# Patient Record
Sex: FEMALE | URBAN - METROPOLITAN AREA
[De-identification: names, ages, dates, MRNs, and addresses within clinical notes are randomized per-mention and may not be internally consistent; named-entity substitution may affect disease eponyms.]

---

## 2023-11-15 ENCOUNTER — OFFICE VISIT (OUTPATIENT)
Dept: URBAN - METROPOLITAN AREA CLINIC 92 | Facility: CLINIC | Age: 73
End: 2023-11-15
Payer: COMMERCIAL

## 2023-11-15 VITALS
WEIGHT: 130.2 LBS | SYSTOLIC BLOOD PRESSURE: 137 MMHG | DIASTOLIC BLOOD PRESSURE: 73 MMHG | BODY MASS INDEX: 28.09 KG/M2 | TEMPERATURE: 97.2 F | HEART RATE: 73 BPM | HEIGHT: 57 IN

## 2023-11-15 DIAGNOSIS — R14.0 BLOATING: ICD-10-CM

## 2023-11-15 DIAGNOSIS — R13.19 ESOPHAGEAL DYSPHAGIA: ICD-10-CM

## 2023-11-15 DIAGNOSIS — R14.2 BELCHING: ICD-10-CM

## 2023-11-15 DIAGNOSIS — R10.13 EPIGASTRIC DISCOMFORT: ICD-10-CM

## 2023-11-15 PROCEDURE — 99204 OFFICE O/P NEW MOD 45 MIN: CPT

## 2023-11-15 RX ORDER — METFORMIN HYDROCHLORIDE 1000 MG/1
1 TABLET WITH A MEAL TABLET, FILM COATED ORAL ONCE A DAY
Status: ACTIVE | COMMUNITY

## 2023-11-15 NOTE — HPI-TODAY'S VISIT:
73-year-old female patient presents today due to bloating.  She was referred to us by Dr. Martine Huber a copy of this note be sent to referring provider.  She was seen in 2020 for screening colonoscopy.  Colonoscopy 2-7-2020 demonstrated external hemorrhoids, erythematous mucosa in distal rectum path demonstrated focal active colitis likeyl from bowel prep.   Currently patient notes that she has been having epigastric abdominal discomfort as well as bloating for the past 2 months.  Eating and lying down makes the pain worse.  She was given omeprazole 40 mg by her PCP but did not help after several weeks so she discontinued.  Pain is a 6/10.  She also notes belching right after eating.  Denies any heartburn or regurgitation.  She also notes dysphagia to solids that has been occurring for many years.  She denies dysphagia with liquids, odynophagia.  Symptoms are not getting worse over time.  She smoked tobacco for 15 years about 4 cigarettes daily.  She has not been a heavy alcohol drinker. Bowel movements are typically regular but can have increased bowel movements with certain types of foods such as coffee.  She denies any hematochezia or melena. She had labs with her primary care provider in October she was told that her A1c was 8% so glipizide was added.  She was diagnosed with diabetes 7 to 8 years ago.  She was on Trulicity for several months and was switched over to Ozempic which she was causing her to gain weight, so she discontinued this 2 weeks ago.  Her primary care also thought that her symptoms may be from Ozempic use.

## 2023-12-12 ENCOUNTER — OUT OF OFFICE VISIT (OUTPATIENT)
Dept: URBAN - METROPOLITAN AREA SURGERY CENTER 16 | Facility: SURGERY CENTER | Age: 73
End: 2023-12-12
Payer: COMMERCIAL

## 2023-12-12 ENCOUNTER — CLAIMS CREATED FROM THE CLAIM WINDOW (OUTPATIENT)
Dept: URBAN - METROPOLITAN AREA CLINIC 4 | Facility: CLINIC | Age: 73
End: 2023-12-12
Payer: COMMERCIAL

## 2023-12-12 DIAGNOSIS — R13.19 CERVICAL DYSPHAGIA: ICD-10-CM

## 2023-12-12 DIAGNOSIS — K29.40 ATROPHIC GASTRITIS: ICD-10-CM

## 2023-12-12 DIAGNOSIS — K31.A19 GASTRIC INTESTINAL METAPLASIA WITHOUT DYSPLASIA: ICD-10-CM

## 2023-12-12 DIAGNOSIS — K21.9 ACID REFLUX: ICD-10-CM

## 2023-12-12 DIAGNOSIS — K21.9 GASTRO-ESOPHAGEAL REFLUX DISEASE WITHOUT ESOPHAGITIS: ICD-10-CM

## 2023-12-12 DIAGNOSIS — K29.40 CHRONIC ATROPHIC GASTRITIS WITHOUT BLEEDING: ICD-10-CM

## 2023-12-12 DIAGNOSIS — E03.8 ADULT ONSET HYPOTHYROIDISM: ICD-10-CM

## 2023-12-12 DIAGNOSIS — R10.13 ABDOMINAL DISCOMFORT, EPIGASTRIC: ICD-10-CM

## 2023-12-12 PROCEDURE — 88305 TISSUE EXAM BY PATHOLOGIST: CPT | Performed by: PATHOLOGY

## 2023-12-12 PROCEDURE — G8907 PT DOC NO EVENTS ON DISCHARG: HCPCS | Performed by: INTERNAL MEDICINE

## 2023-12-12 PROCEDURE — 88312 SPECIAL STAINS GROUP 1: CPT | Performed by: PATHOLOGY

## 2023-12-12 PROCEDURE — 00731 ANES UPR GI NDSC PX NOS: CPT | Performed by: ANESTHESIOLOGY

## 2023-12-12 PROCEDURE — 43239 EGD BIOPSY SINGLE/MULTIPLE: CPT | Performed by: INTERNAL MEDICINE

## 2023-12-12 PROCEDURE — 88341 IMHCHEM/IMCYTCHM EA ADD ANTB: CPT | Performed by: PATHOLOGY

## 2023-12-12 PROCEDURE — 88342 IMHCHEM/IMCYTCHM 1ST ANTB: CPT | Performed by: PATHOLOGY

## 2023-12-12 PROCEDURE — 00731 ANES UPR GI NDSC PX NOS: CPT | Performed by: ANESTHESIOLOGIST ASSISTANT

## 2024-01-25 ENCOUNTER — OFFICE VISIT (OUTPATIENT)
Dept: URBAN - METROPOLITAN AREA CLINIC 92 | Facility: CLINIC | Age: 74
End: 2024-01-25
Payer: COMMERCIAL

## 2024-01-25 ENCOUNTER — LAB OUTSIDE AN ENCOUNTER (OUTPATIENT)
Dept: URBAN - METROPOLITAN AREA CLINIC 92 | Facility: CLINIC | Age: 74
End: 2024-01-25

## 2024-01-25 VITALS
SYSTOLIC BLOOD PRESSURE: 154 MMHG | DIASTOLIC BLOOD PRESSURE: 79 MMHG | HEIGHT: 57 IN | TEMPERATURE: 97.3 F | HEART RATE: 71 BPM | BODY MASS INDEX: 28.26 KG/M2 | WEIGHT: 131 LBS

## 2024-01-25 DIAGNOSIS — R10.13 EPIGASTRIC DISCOMFORT: ICD-10-CM

## 2024-01-25 DIAGNOSIS — R13.19 ESOPHAGEAL DYSPHAGIA: ICD-10-CM

## 2024-01-25 DIAGNOSIS — R14.2 BELCHING: ICD-10-CM

## 2024-01-25 DIAGNOSIS — K29.40 GASTRIC ATROPHY: ICD-10-CM

## 2024-01-25 DIAGNOSIS — R14.0 BLOATING: ICD-10-CM

## 2024-01-25 DIAGNOSIS — R19.7 DIARRHEA, UNSPECIFIED TYPE: ICD-10-CM

## 2024-01-25 PROBLEM — 84568007: Status: ACTIVE | Noted: 2024-01-25

## 2024-01-25 PROCEDURE — 99214 OFFICE O/P EST MOD 30 MIN: CPT

## 2024-01-25 RX ORDER — METFORMIN HYDROCHLORIDE 1000 MG/1
1 TABLET WITH A MEAL TABLET, FILM COATED ORAL ONCE A DAY
Status: ACTIVE | COMMUNITY

## 2024-01-25 RX ORDER — PANTOPRAZOLE SODIUM 40 MG/1
1 TABLET TABLET, DELAYED RELEASE ORAL ONCE A DAY
Qty: 90 TABLET | Refills: 1 | OUTPATIENT
Start: 2024-01-25

## 2024-01-25 NOTE — HPI-TODAY'S VISIT:
11/15/23 73-year-old female patient presents today due to bloating.  She was referred to us by Dr. Martine Huber a copy of this note be sent to referring provider.  She was seen in 2020 for screening colonoscopy.  Colonoscopy 2-7-2020 demonstrated external hemorrhoids, erythematous mucosa in distal rectum path demonstrated focal active colitis likeyl from bowel prep.   Currently patient notes that she has been having epigastric abdominal discomfort as well as bloating for the past 2 months.  Eating and lying down makes the pain worse.  She was given omeprazole 40 mg by her PCP but did not help after several weeks so she discontinued.  Pain is a 6/10.  She also notes belching right after eating.  Denies any heartburn or regurgitation.  She also notes dysphagia to solids that has been occurring for many years.  She denies dysphagia with liquids, odynophagia.  Symptoms are not getting worse over time.  She smoked tobacco for 15 years about 4 cigarettes daily.  She has not been a heavy alcohol drinker. Bowel movements are typically regular but can have increased bowel movements with certain types of foods such as coffee.  She denies any hematochezia or melena. She had labs with her primary care provider in October she was told that her A1c was 8% so glipizide was added.  She was diagnosed with diabetes 7 to 8 years ago.  She was on Trulicity for several months and was switched over to Ozempic which she was causing her to gain weight, so she discontinued this 2 weeks ago.  Her primary care also thought that her symptoms may be from Ozempic use.   1/25/24 Since her last visit patient had a upper endoscopy on 12- that demonstrated diffuse atrophic mucosa in the entire stomach biopsies demonstrated chronic lymphoplasmacytic infiltrates, body type granular atrophy,  endocrine cell hyperplasia and focal intestinal metaplasia.  She also had reflux type changes in the esophagus. Since her last visit patient has had a decrease in bloating but now is having more epigastric abdominal pain after consuming anything that she eats.  She also has increased gas and will have belching throughout the day.  She still denies any heartburn or regurgitation.  Her dysphagia has gotten better. She is now also noting increased diarrhea and has about 3 bowel movements daily that are looser.  No nighttime symptoms or urgency.  No new medications or antibiotics since onset of symptoms.  She denies any hematochezia or melena.  She denies any abnormal weight loss. Blood work in December noted that her A1c had gone up to 10%. She is off ozempic

## 2024-01-27 ENCOUNTER — LAB OUTSIDE AN ENCOUNTER (OUTPATIENT)
Dept: URBAN - METROPOLITAN AREA CLINIC 92 | Facility: CLINIC | Age: 74
End: 2024-01-27

## 2024-01-29 ENCOUNTER — TELEPHONE ENCOUNTER (OUTPATIENT)
Dept: URBAN - METROPOLITAN AREA CLINIC 92 | Facility: CLINIC | Age: 74
End: 2024-01-29

## 2024-01-29 LAB
ABSOLUTE BASOPHILS: 43
ABSOLUTE EOSINOPHILS: 408
ABSOLUTE LYMPHOCYTES: 3196
ABSOLUTE MONOCYTES: 689
ABSOLUTE NEUTROPHILS: 4165
ANTIPARIETAL CELL ANTIBODY: 48
BASOPHILS: 0.5
EOSINOPHILS: 4.8
FERRITIN, SERUM: 13
HEMATOCRIT: 39.1
HEMOGLOBIN: 12.2
INTRINSIC FACTOR BLOCKING: NEGATIVE
IRON BIND.CAP.(TIBC): 379
IRON SATURATION: 11
IRON: 42
LYMPHOCYTES: 37.6
MCH: 25.8
MCHC: 31.2
MCV: 82.8
MONOCYTES: 8.1
MPV: 10.7
NEUTROPHILS: 49
PLATELET COUNT: 314
RDW: 15
RED BLOOD CELL COUNT: 4.72
VITAMIN B12: 272
WHITE BLOOD CELL COUNT: 8.5

## 2024-01-29 RX ORDER — FERROUS SULFATE 325(65) MG
1 TABLET TABLET ORAL ONCE A DAY
Qty: 90 TABLET | Refills: 0 | OUTPATIENT
Start: 2024-01-29

## 2024-01-31 ENCOUNTER — TELEPHONE ENCOUNTER (OUTPATIENT)
Dept: URBAN - METROPOLITAN AREA CLINIC 92 | Facility: CLINIC | Age: 74
End: 2024-01-31

## 2024-01-31 LAB
ADENOVIRUS F 40/41: NOT DETECTED
CALPROTECTIN, STOOL - QDX: (no result)
CAMPYLOBACTER: NOT DETECTED
CLOSTRIDIUM DIFFICILE: NOT DETECTED
ENTAMOEBA HISTOLYTICA: NOT DETECTED
ENTEROAGGREGATIVE E.COLI: NOT DETECTED
ENTEROTOXIGENIC E.COLI: NOT DETECTED
ESCHERICHIA COLI O157: NOT DETECTED
GIARDIA LAMBLIA: NOT DETECTED
NOROVIRUS GI/GII: NOT DETECTED
PANCREATICELASTASE ELISA, STOOL: (no result)
ROTAVIRUS A: NOT DETECTED
SALMONELLA SPP.: NOT DETECTED
SHIGA-LIKE TOXIN PRODUCING E.COLI: NOT DETECTED
SHIGELLA SPP. / ENTEROINVASIVE E.COLI: NOT DETECTED
VIBRIO PARAHAEMOLYTICUS: NOT DETECTED
VIBRIO SPP.: NOT DETECTED
YERSINIA ENTEROCOLITICA: NOT DETECTED

## 2024-01-31 RX ORDER — PANCRELIPASE 36000; 180000; 114000 [USP'U]/1; [USP'U]/1; [USP'U]/1
AS DIRECTED CAPSULE, DELAYED RELEASE PELLETS ORAL
Qty: 800 | Refills: 1 | OUTPATIENT
Start: 2024-01-31 | End: 2024-07-29

## 2024-03-28 ENCOUNTER — OV EP (OUTPATIENT)
Dept: URBAN - METROPOLITAN AREA CLINIC 92 | Facility: CLINIC | Age: 74
End: 2024-03-28
Payer: COMMERCIAL

## 2024-03-28 VITALS
DIASTOLIC BLOOD PRESSURE: 82 MMHG | SYSTOLIC BLOOD PRESSURE: 142 MMHG | HEART RATE: 77 BPM | TEMPERATURE: 97 F | HEIGHT: 57 IN | BODY MASS INDEX: 27.4 KG/M2 | WEIGHT: 127 LBS

## 2024-03-28 DIAGNOSIS — R13.19 ESOPHAGEAL DYSPHAGIA: ICD-10-CM

## 2024-03-28 DIAGNOSIS — R10.13 EPIGASTRIC DISCOMFORT: ICD-10-CM

## 2024-03-28 DIAGNOSIS — R14.2 BELCHING: ICD-10-CM

## 2024-03-28 DIAGNOSIS — K29.40 GASTRIC ATROPHY: ICD-10-CM

## 2024-03-28 DIAGNOSIS — R14.0 BLOATING: ICD-10-CM

## 2024-03-28 DIAGNOSIS — R19.7 DIARRHEA, UNSPECIFIED TYPE: ICD-10-CM

## 2024-03-28 PROCEDURE — 99213 OFFICE O/P EST LOW 20 MIN: CPT

## 2024-03-28 RX ORDER — METFORMIN HYDROCHLORIDE 1000 MG/1
1 TABLET WITH A MEAL TABLET, FILM COATED ORAL ONCE A DAY
Status: ACTIVE | COMMUNITY

## 2024-03-28 RX ORDER — FERROUS SULFATE 325(65) MG
1 TABLET TABLET ORAL ONCE A DAY
Qty: 90 TABLET | Refills: 0 | Status: ACTIVE | COMMUNITY
Start: 2024-01-29

## 2024-03-28 RX ORDER — PANTOPRAZOLE SODIUM 40 MG/1
1 TABLET TABLET, DELAYED RELEASE ORAL ONCE A DAY
Qty: 90 TABLET | Refills: 1 | Status: ACTIVE | COMMUNITY
Start: 2024-01-25

## 2024-03-28 NOTE — PHYSICAL EXAM CONSTITUTIONAL:
Pt requesting to speak with daughter. Nurse called and left message for daughter to call her room phone. normal,  alert,  in no acute distress,  well developed, well nourished,  ambulating without difficulty,  normal communication ability

## 2024-03-28 NOTE — HPI-TODAY'S VISIT:
11/15/23 73-year-old female patient presents today due to bloating.  She was referred to us by Dr. Martine Huber a copy of this note be sent to referring provider.  She was seen in 2020 for screening colonoscopy.  Colonoscopy 2-7-2020 demonstrated external hemorrhoids, erythematous mucosa in distal rectum path demonstrated focal active colitis likeyl from bowel prep.   Currently patient notes that she has been having epigastric abdominal discomfort as well as bloating for the past 2 months.  Eating and lying down makes the pain worse.  She was given omeprazole 40 mg by her PCP but did not help after several weeksso she discontinued.  Pain is a 6/10.  She also notes belching right after eating.  Denies any heartburn or regurgitation.  She also notes dysphagia to solids that has been occurring for many years.  She denies dysphagia with liquids, odynophagia.  Symptoms are not getting worse over time.  She smoked tobacco for 15 years about 4 cigarettes daily.  She has not been a heavy alcohol drinker. Bowel movements are typically regular but can have increased bowel movements with certain types of foods such as coffee.  She denies any hematochezia or melena. She had labs with her primary care provider in October she was told that her A1c was 8%so glipizide was added.  She was diagnosed with diabetes 7 to 8 years ago.  She was on Trulicity for several months and was switched over to Ozempic which she was causing her to gain weight, so she discontinued this 2 weeks ago.  Her primary care also thought that her symptoms may be from Ozempic use.   1/25/24 Since her last visit patient had a upper endoscopy on 12- that demonstrated diffuse atrophic mucosa in the entire stomach biopsies demonstrated chronic lymphoplasmacytic infiltrates, body type granular atrophy,  endocrine cell hyperplasia and focal intestinal metaplasia.  She also had reflux type changes in the esophagus. Since her last visit patient has had a decrease in bloating but now is having more epigastric abdominal pain after consuming anything that she eats.  She also has increased gas and will have belching throughout the day.  She still denies any heartburn or regurgitation.  Her dysphagia has gotten better. She is now also noting increased diarrhea and has about 3 bowel movements daily that are looser.  No nighttime symptoms or urgency.  No new medications or antibiotics since onset of symptoms.  She denies any hematochezia or melena.  She denies any abnormal weight loss. Blood work in December noted that her A1c had gone up to 10%. She is off ozempic  3/28/24 After last visit we obtained labs that demonstrated normal CBC, vitamin B12, intrinsic factor.  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 She has be on oral iron daily. Has constipation with this.  Stool demonstrated calprotectin 125, pancreatic elastase 188 negative for infection.  She was given Creon for her EPI. She stopped this since it make her glucose high. Her BM have gone back to normal. No hematochezia or melena.  CT scan 1- demonstrated diverticulosis without diverticulitis and history of sacrococcygeal osseous injury no abnormalities of pancreas She still has belching, bloating and gas. She was told to start pantoprazole 40mg dailybut she has not been compliant with this.

## 2024-05-22 ENCOUNTER — DASHBOARD ENCOUNTERS (OUTPATIENT)
Age: 74
End: 2024-05-22

## 2024-05-28 ENCOUNTER — OFFICE VISIT (OUTPATIENT)
Dept: URBAN - METROPOLITAN AREA CLINIC 92 | Facility: CLINIC | Age: 74
End: 2024-05-28

## 2024-05-28 RX ORDER — PANTOPRAZOLE SODIUM 40 MG/1
1 TABLET TABLET, DELAYED RELEASE ORAL ONCE A DAY
Qty: 90 TABLET | Refills: 1 | Status: ACTIVE | COMMUNITY
Start: 2024-01-25

## 2024-05-28 RX ORDER — FERROUS SULFATE 325(65) MG
1 TABLET TABLET ORAL ONCE A DAY
Qty: 90 TABLET | Refills: 0 | Status: ACTIVE | COMMUNITY
Start: 2024-01-29

## 2024-05-28 RX ORDER — METFORMIN HYDROCHLORIDE 1000 MG/1
1 TABLET WITH A MEAL TABLET, FILM COATED ORAL ONCE A DAY
Status: ACTIVE | COMMUNITY

## 2024-06-18 ENCOUNTER — OFFICE VISIT (OUTPATIENT)
Dept: URBAN - METROPOLITAN AREA CLINIC 92 | Facility: CLINIC | Age: 74
End: 2024-06-18

## 2024-06-18 RX ORDER — PANTOPRAZOLE SODIUM 40 MG/1
1 TABLET TABLET, DELAYED RELEASE ORAL ONCE A DAY
Qty: 90 TABLET | Refills: 1 | Status: ACTIVE | COMMUNITY
Start: 2024-01-25

## 2024-06-18 RX ORDER — FERROUS SULFATE 325(65) MG
1 TABLET TABLET ORAL ONCE A DAY
Qty: 90 TABLET | Refills: 0 | Status: ACTIVE | COMMUNITY
Start: 2024-01-29

## 2024-06-18 RX ORDER — METFORMIN HYDROCHLORIDE 1000 MG/1
1 TABLET WITH A MEAL TABLET, FILM COATED ORAL ONCE A DAY
Status: ACTIVE | COMMUNITY

## 2024-06-18 NOTE — HPI-TODAY'S VISIT:
11/15/23 73-year-old female patient presents today due to bloating.  She was referred to us by Dr. Martine Huber a copy of this note be sent to referring provider.  She was seen in 2020 for screening colonoscopy.  Colonoscopy 2-7-2020 demonstrated external hemorrhoids, erythematous mucosa in distal rectum path demonstrated focal active colitis likeyl from bowel prep.   Currently patient notes that she has been having epigastric abdominal discomfort as well as bloating for the past 2 months.  Eating and lying down makes the pain worse.  She was given omeprazole 40 mg by her PCP but did not help after several weeksso she discontinued.  Pain is a 6/10.  She also notes belching right after eating.  Denies any heartburn or regurgitation.  She also notes dysphagia to solids that has been occurring for many years.  She denies dysphagia with liquids, odynophagia.  Symptoms are not getting worse over time.  She smoked tobacco for 15 years about 4 cigarettes daily.  She has not been a heavy alcohol drinker. Bowel movements are typically regular but can have increased bowel movements with certain types of foods such as coffee.  She denies any hematochezia or melena. She had labs with her primary care provider in October she was told that her A1c was 8%so glipizide was added.  She was diagnosed with diabetes 7 to 8 years ago.  She was on Trulicity for several months and was switched over to Ozempic which she was causing her to gain weight, so she discontinued this 2 weeks ago.  Her primary care also thought that her symptoms may be from Ozempic use.   1/25/24 Since her last visit patient had a upper endoscopy on 12- that demonstrated diffuse atrophic mucosa in the entire stomach biopsies demonstrated chronic lymphoplasmacytic infiltrates, body type granular atrophy,  endocrine cell hyperplasia and focal intestinal metaplasia.  She also had reflux type changes in the esophagus. Since her last visit patient has had a decrease in bloating but now is having more epigastric abdominal pain after consuming anything that she eats.  She also has increased gas and will have belching throughout the day.  She still denies any heartburn or regurgitation.  Her dysphagia has gotten better. She is now also noting increased diarrhea and has about 3 bowel movements daily that are looser.  No nighttime symptoms or urgency.  No new medications or antibiotics since onset of symptoms.  She denies any hematochezia or melena.  She denies any abnormal weight loss. Blood work in December noted that her A1c had gone up to 10%. She is off ozempic  3/28/24 After last visit we obtained labs that demonstrated normal CBC, vitamin B12, intrinsic factor.  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 She has be on oral iron daily. Has constipation with this.  Stool demonstrated calprotectin 125, pancreatic elastase 188 negative for infection.  She was given Creon for her EPI. She stopped this since it make her glucose high. Her BM have gone back to normal. No hematochezia or melena.  CT scan 1- demonstrated diverticulosis without diverticulitis and history of sacrococcygeal osseous injury no abnormalities of pancreas She still has belching, bloating and gas. She was told to start pantoprazole 40mg dailybut she has not been compliant with this. After last visit patient did not Calprotectin or SIBO test

## 2024-08-23 ENCOUNTER — ERX REFILL RESPONSE (OUTPATIENT)
Dept: URBAN - METROPOLITAN AREA CLINIC 92 | Facility: CLINIC | Age: 74
End: 2024-08-23

## 2024-08-23 RX ORDER — PANTOPRAZOLE SODIUM 40 MG/1
1 TABLET TABLET, DELAYED RELEASE ORAL ONCE A DAY
Qty: 90 TABLET | Refills: 1 | OUTPATIENT

## 2024-08-23 RX ORDER — PANTOPRAZOLE SODIUM 40 MG/1
TAKE 1 TABLET BY MOUTH EVERY DAY TABLET, DELAYED RELEASE ORAL
Qty: 90 TABLET | Refills: 0 | OUTPATIENT